# Patient Record
Sex: MALE | Race: OTHER
[De-identification: names, ages, dates, MRNs, and addresses within clinical notes are randomized per-mention and may not be internally consistent; named-entity substitution may affect disease eponyms.]

---

## 2017-12-23 ENCOUNTER — HOSPITAL ENCOUNTER (EMERGENCY)
Dept: HOSPITAL 80 - FED | Age: 37
LOS: 1 days | Discharge: HOME | End: 2017-12-24
Payer: COMMERCIAL

## 2017-12-23 DIAGNOSIS — J93.83: Primary | ICD-10-CM

## 2017-12-23 LAB — PLATELET # BLD: 232 10^3/UL (ref 150–400)

## 2017-12-23 PROCEDURE — 0W9B30Z DRAINAGE OF LEFT PLEURAL CAVITY WITH DRAINAGE DEVICE, PERCUTANEOUS APPROACH: ICD-10-PCS

## 2017-12-23 NOTE — EDPHY
H & P


Stated Complaint: CP x few hours


Time Seen by Provider: 12/23/17 23:24


HPI/ROS: 





HPI


The patient presents with chest pain which began at about 8:45 p.m. tonight 

when he went outside into the cold and was shoveling the snow off of his car.  

It is been constant ever since, he describes it as an indigestion sort of pain 

which is worse with deep breaths and with leaning forward.  He went inside and 

took a shower and this improved his symptoms somewhat, however they did 

persist.  He noticed when he went outside again his chest pain became worse and 

it is a bit better now that he is in the emergency department.  He did not have 

any dizziness, diaphoresis, nausea or vomiting.  His pain is worse on the left 

side of his chest.  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 

in decades..





REVIEW OF SYSTEMS


Constitutional:  No fever, no chills.


Eyes:  No discharge.


ENT:  No sore throat.


Cardiovascular:  Positive for chest pain, no palpitations.


Respiratory:  No cough, no shortness of breath.


Gastrointestinal:  No abdominal pain, no vomiting.


Genitourinary:  No hematuria.


Musculoskeletal:  No back pain.


Skin:  No rashes.


Neurological:  No headache.





PMHx:  Familial Mediterranean fever on colchicine





Soc Hx:





FHx:  No family history of early CAD





PHYSICAL


General Appearance: Alert, no distress


Eyes: Pupils equal and round no pallor or injection


ENT, Mouth: Mucous membranes moist


Respiratory: There are no retractions, lungs are clear to auscultation


Cardiovascular:  Regular rate and rhythm 


Gastrointestinal:  Abdomen is soft and non-tender, no masses, bowel sounds 

normal 


Neurological:  A&O, moves all extremities


Skin:  Warm and dry, no rashes


Musculoskeletal: Neck is supple non tender 


Extremities:  symmetrical, full range of motion 


Psychiatric:  Patient is oriented X 3, there is no agitation 





Source: Patient


Exam Limitations: No limitations





- Personal History


Current Tetanus/Diphtheria Vaccine: Yes


Tetanus Vaccine Date: <10 years





- Medical/Surgical History


Hx Asthma: No


Hx Chronic Respiratory Disease: No


Hx Diabetes: No


Hx Cardiac Disease: No


Hx Renal Disease: No


Hx Cirrhosis: No


Hx Alcoholism: No


Hx HIV/AIDS: No


Hx Splenectomy or Spleen Trauma: No


Other PMH: family mediteranian fever (FMF)





- Social History


Smoking Status: Never smoked


Constitutional: 


 Initial Vital Signs











Temperature (C)  36.5 C   12/23/17 23:17


 


Heart Rate  102 H  12/23/17 23:17


 


Respiratory Rate  18   12/23/17 23:17


 


Blood Pressure  131/78 H  12/23/17 23:17


 


O2 Sat (%)  97   12/23/17 23:17








 











O2 Delivery Mode               Nasal Cannula


 


O2 (L/minute)                  2














Allergies/Adverse Reactions: 


 





No Known Allergies Allergy (Unverified 12/23/17 23:20)


 








Home Medications: 














 Medication  Instructions  Recorded


 


Colchicine  12/23/17














Medical Decision Making





- Diagnostics


EKG Interpretation: 





EKG:  Complete interpretation has been separately recorded in the [email or web address] 

archive.  Summary impression:  Normal sinus rhythm





Imaging Results: 


 Imaging Impressions





Chest X-Ray  12/23/17 23:38


Impression: Large left pneumothorax.


 


Results called and discussed with Harleen Browne MD on 12/23/2017 at 23:59








Repeat chest x-ray shows catheter tip in good position, lung is reinflated, 

interpreted by me, radiology interpretation is pending


Imaging: Discussed imaging studies w/ On call Radiologist, I viewed and 

interpreted images myself


Procedures: 





Procedure:  Chest tube placement.





The indication for the procedure was left-sided large pneumothorax.  A timeout 

was observed and patients identity and correct procedure location confirmed.  

The patient was prepped in a sterile fashion.  The patient was anesthetized 

with 1% lidocaine with epinephrine.  After blunt dissection a 9 French chest 

tube was placed in the 2nd  intercostal space at midclavicular line on the left 

side.  The tube was sutured in place and dressed.  Post placement chest x-ray 

demonstrated the tube to be in the appropriate position.  Following placement 

of the tube the patient's condition was stable.  The patient tolerated the 

procedure well there were no complications.  The procedure was performed by 

myself.


Differential Diagnosis: 





This is a 37-year-old male with familial Mediterranean fever who presents with 

chest pain which began about 3 hr ago while out in the cold shoveling snow off 

of his car.  The pain is like indigestion, worse with leaning forward and with 

deep breaths.  He has not had this pain in many years.





With familial Mediterranean fever, patient's can have chest pain related to 

inflammation of the pleura, pericarditis, sub diaphragmatic irritation, pleural 

effusion.  This can happen with a fever.  Vigorous exercise, exposure to cold, 

motion all stressed can all provoked symptoms.





Other causes of the patient's chest pain should also be entertained and these 

include ACS, pulmonary embolism, GERD.





In the emergency department, EKG was obtained and was unremarkable.





I got a call from the radiologist who reports that the patient has a large  left

-sided pneumothorax.





I consulted with Dr. Haddad of General surgery and together we placed an Arrow 

pneumothorax chest tube.





The patient was observed for about 1 hr, repeat chest x-ray shows tube in 

appropriate position with re-expanded lung.





The patient will be discharged home with chest tube in place.  He was taught 

how to check for air leak and when he does not have 1 for 24 hr, he can follow 

up with Dr. Haddad in the clinic.  We have answered all his questions.





- Data Points


Laboratory Results: 


 Laboratory Results





 12/23/17 23:30 





 12/23/17 23:30 





 











  12/23/17 12/23/17 12/23/17





  23:30 23:30 23:30


 


WBC      7.77 10^3/uL 10^3/uL





     (3.80-9.50) 


 


RBC      5.40 10^6/uL 10^6/uL





     (4.40-6.38) 


 


Hgb      15.1 g/dL g/dL





     (13.7-17.5) 


 


Hct      44.2 % %





     (40.0-51.0) 


 


MCV      81.9 fL fL





     (81.5-99.8) 


 


MCH      28.0 pg pg





     (27.9-34.1) 


 


MCHC      34.2 g/dL g/dL





     (32.4-36.7) 


 


RDW      13.6 % %





     (11.5-15.2) 


 


Plt Count      232 10^3/uL 10^3/uL





     (150-400) 


 


MPV      10.0 fL fL





     (8.7-11.7) 


 


Neut % (Auto)      56.4 % %





     (39.3-74.2) 


 


Lymph % (Auto)      32.2 % %





     (15.0-45.0) 


 


Mono % (Auto)      8.4 % %





     (4.5-13.0) 


 


Eos % (Auto)      1.9 % %





     (0.6-7.6) 


 


Baso % (Auto)      0.8 % %





     (0.3-1.7) 


 


Nucleat RBC Rel Count      0.0 % %





     (0.0-0.2) 


 


Absolute Neuts (auto)      4.39 10^3/uL 10^3/uL





     (1.70-6.50) 


 


Absolute Lymphs (auto)      2.50 10^3/uL 10^3/uL





     (1.00-3.00) 


 


Absolute Monos (auto)      0.65 10^3/uL 10^3/uL





     (0.30-0.80) 


 


Absolute Eos (auto)      0.15 10^3/uL 10^3/uL





     (0.03-0.40) 


 


Absolute Basos (auto)      0.06 10^3/uL 10^3/uL





     (0.02-0.10) 


 


Absolute Nucleated RBC      0.00 10^3/uL 10^3/uL





     (0-0.01) 


 


Immature Gran %      0.3 % %





     (0.0-1.1) 


 


Immature Gran #      0.02 10^3/uL 10^3/uL





     (0.00-0.10) 


 


D-Dimer  < 0.27 ug/mLFEU ug/mLFEU    





   (0.00-0.50)   


 


Sodium    145 mEq/L H mEq/L  





    (134-144)  


 


Potassium    4.5 mEq/L mEq/L  





    (3.5-5.2)  


 


Chloride    105 mEq/L mEq/L  





    ()  


 


Carbon Dioxide    25 mEq/l mEq/l  





    (22-31)  


 


Anion Gap    15 mEq/L mEq/L  





    (8-16)  


 


BUN    20 mg/dL mg/dL  





    (7-23)  


 


Creatinine    0.9 mg/dL mg/dL  





    (0.7-1.3)  


 


Estimated GFR    > 60   





    


 


Glucose    99 mg/dL mg/dL  





    ()  


 


Calcium    9.7 mg/dL mg/dL  





    (8.5-10.4)  


 


Troponin I    < 0.012 ng/mL ng/mL  





    (0.000-0.034)  


 


C-Reactive Protein    < 5.0 mg/L mg/L  





    (<10.0)  











Medications Given: 


 








Discontinued Medications





Aspirin (Aspirin)  324 mg PO EDNOW ONE


   Stop: 12/23/17 23:39


   Last Admin: 12/23/17 23:48 Dose:  324 mg


Sodium Chloride (Ns)  1,000 mls @ 0 mls/hr IV ONCE ONE


   PRN Reason: Wide Open


   Stop: 12/24/17 00:26


   Last Admin: 12/24/17 00:25 Dose:  1,000 mls








Departure





- Departure


Disposition: Home, Routine, Self-Care


Clinical Impression: 


 Spontaneous pneumothorax





Condition: Good


Instructions:  Spontaneous Pneumothorax (ED), How to Care for Your Chest or 

Abdominal Catheter (ED)


Additional Instructions: 


Please check once a day for bubbles in your tube by placing the end of the tube 

in a cup of water and coughing.  When the bubbles are gone for 1 day then you 

can return to have the tube removed.  You can call Dr. Haddad's office to arrange 

for the tube to be removed.


Referrals: 


Sun Mars MD [Primary Care Provider] - As per Instructions


Delroy Haddad MD [Medical Doctor] - As per Instructions

## 2017-12-23 NOTE — CPEKG
Heart Rate: 99

RR Interval: 606

P-R Interval: 136

QRSD Interval: 84

QT Interval: 336

QTC Interval: 432

P Axis: 82

QRS Axis: 89

T Wave Axis: 74

EKG Severity - BORDERLINE ECG -

EKG Impression: INCOMPLETE ANALYSIS DUE TO MISSING DATA IN PRECORDIAL LEAD(S)

EKG Impression: SINUS RHYTHM

EKG Impression: BORDERLINE T ABNORMALITIES, ANT-LAT LEADS

Electronically Signed By: Nick Adam 27-Dec-2017 11:39:10

## 2017-12-24 VITALS — SYSTOLIC BLOOD PRESSURE: 113 MMHG | DIASTOLIC BLOOD PRESSURE: 75 MMHG | HEART RATE: 69 BPM

## 2017-12-24 VITALS — RESPIRATION RATE: 20 BRPM

## 2017-12-24 VITALS — TEMPERATURE: 99 F

## 2017-12-24 VITALS — OXYGEN SATURATION: 98 %

## 2017-12-25 NOTE — GCON
[f 
rep st]



                                                                    CONSULTATION





REQUESTING PHYSICIAN:  Harleen Browne MD



REASON FOR EVALUATION:  Pneumothorax.



HISTORY OF PRESENTING ILLNESS:  37-year-old healthy male with a significant 
history for familial Mediterranean fever, presents to the emergency room 
tonight with a complaint of worsening left chest pain and shortness of breath.  
He reports that he has had a 2-week prodrome of an upper respiratory type 
infection with a dry nonproductive cough and sinus type pain.  He denied fevers 
or chills with this event.  He reported in retrospect that being out in the 
cold did make his breathing worse over this time period.  Today, while 
shoveling snow off his car, he developed severe worsening of his left-sided 
chest pain, bringing him to the emergency room for further evaluation.  No 
prior history of similar complaints.  No right-sided chest complaints.  No 
history of trauma.



PAST MEDICAL HISTORY:  Familial Mediterranean fever.



PAST SURGICAL HISTORY:  None.



MEDICATIONS:  Colchicine.



ALLERGIES:  No known drug allergies.



SOCIAL HISTORY:  No significant alcohol or tobacco.



FAMILY HISTORY:  Noncontributory.



REVIEW OF SYSTEMS:  Notable for a 2-week prodrome of upper respiratory 
complaints as noted above.  Negative 12-point review otherwise.



PHYSICAL EXAMINATION:  

ED VITAL SIGNS:  Temperature 36.5, blood pressure 130/80, heart rate 100, 
respirations 18, 97% saturation on 2 L.  

GENERAL:  The patient is alert, appropriate, mildly uncomfortable.  The patient 
is slender in appearance.  Anicteric.  NECK:  No cervical lymphadenopathy.  No 
jugular venous distention.  Trachea midline without crepitus.  HEART:  Regular.
  LUNGS:  Clear right, distant left.  ABDOMEN:  Soft, nontender.  EXTREMITIES:  
Unremarkable.  NEUROLOGIC:  Alert and appropriate x3.  SKIN:  Without rashes.  



Chest x-ray with a large left pneumothorax without shift, normal right lung.



IMPRESSION:  First-time pneumothorax, preceded by a 2-week prodrome upper 
respiratory infection. suspect probable underlying bleb disease.  Recommend 
percutaneous chest tube placement with further outpatient management.  This 
procedure was performed by Dr. Browne without complications.  A post tube 
placement chest x-ray showed excellent lung re-expansion.  The patient will 
follow up with my office in the outpatient setting.  The patient is aware of 
the risk of recurrence as well as contralateral pneumothorax.  All questions 
were entertained.





Job #:  [id number]

MTDD

## 2018-01-02 ENCOUNTER — HOSPITAL ENCOUNTER (OUTPATIENT)
Dept: HOSPITAL 80 - FIMAGING | Age: 38
End: 2018-01-02
Attending: SURGERY
Payer: COMMERCIAL

## 2018-01-02 DIAGNOSIS — Z97.8: Primary | ICD-10-CM
